# Patient Record
Sex: FEMALE | Race: BLACK OR AFRICAN AMERICAN | NOT HISPANIC OR LATINO | Employment: FULL TIME | URBAN - METROPOLITAN AREA
[De-identification: names, ages, dates, MRNs, and addresses within clinical notes are randomized per-mention and may not be internally consistent; named-entity substitution may affect disease eponyms.]

---

## 2018-09-17 LAB
HPV 16: NEGATIVE
HPV 18: NEGATIVE

## 2019-05-02 PROBLEM — K21.9 GERD (GASTROESOPHAGEAL REFLUX DISEASE): Status: ACTIVE | Noted: 2019-05-02

## 2019-05-02 PROBLEM — J45.40 MODERATE PERSISTENT ASTHMA WITHOUT COMPLICATION: Chronic | Status: ACTIVE | Noted: 2019-05-02

## 2019-05-02 PROBLEM — J32.9 CHRONIC SINUSITIS: Status: ACTIVE | Noted: 2019-05-02

## 2019-05-02 PROBLEM — J45.40 MODERATE PERSISTENT ASTHMA WITHOUT COMPLICATION: Status: ACTIVE | Noted: 2019-05-02

## 2019-08-27 PROBLEM — I82.411 ACUTE DEEP VEIN THROMBOSIS (DVT) OF FEMORAL VEIN OF RIGHT LOWER EXTREMITY: Status: ACTIVE | Noted: 2018-09-19

## 2019-08-27 PROBLEM — K29.01 ACUTE SUPERFICIAL GASTRITIS WITH HEMORRHAGE: Status: ACTIVE | Noted: 2018-01-03

## 2019-08-27 PROBLEM — J30.9 CHRONIC ALLERGIC RHINITIS: Status: ACTIVE | Noted: 2019-08-27

## 2019-08-27 PROBLEM — J45.50 UNCOMPLICATED SEVERE PERSISTENT ASTHMA: Status: ACTIVE | Noted: 2017-05-05

## 2019-08-27 PROBLEM — F33.41 RECURRENT MAJOR DEPRESSIVE DISORDER, IN PARTIAL REMISSION: Status: ACTIVE | Noted: 2019-08-27

## 2019-08-27 PROBLEM — F41.9 ANXIETY: Status: ACTIVE | Noted: 2019-08-27

## 2020-01-28 PROBLEM — J45.50 SEVERE PERSISTENT ASTHMA WITHOUT COMPLICATION: Status: ACTIVE | Noted: 2019-05-02

## 2020-02-12 ENCOUNTER — TELEPHONE (OUTPATIENT)
Dept: PHARMACY | Facility: CLINIC | Age: 55
End: 2020-02-12

## 2020-02-12 NOTE — TELEPHONE ENCOUNTER
Informed Patient  that [facility] received prescription for Fasenra and prior authorization is required.  [facility] will be back in touch once insurance determination is received.

## 2020-04-15 ENCOUNTER — TELEPHONE (OUTPATIENT)
Dept: PHARMACY | Facility: CLINIC | Age: 55
End: 2020-04-15

## 2020-04-15 NOTE — TELEPHONE ENCOUNTER
As of 04/15, patient's insurance has termed. Attempted to contact patient about insurance. Patient does not have voice mail set up and is not active on the [facility] portal. We will continue to try and reach out to the patient.

## 2020-11-10 DIAGNOSIS — U07.1 COVID-19 VIRUS DETECTED: ICD-10-CM

## 2020-11-11 ENCOUNTER — NURSE TRIAGE (OUTPATIENT)
Dept: ADMINISTRATIVE | Facility: CLINIC | Age: 55
End: 2020-11-11

## 2020-11-11 NOTE — TELEPHONE ENCOUNTER
Reason for Disposition   Fever present > 3 days (72 hours)    Additional Information   Negative: Severe difficulty breathing (e.g., struggling for each breath, speaks in single words)   Negative: Difficult to awaken or acting confused (e.g., disoriented, slurred speech)   Negative: Bluish (or gray) lips or face now   Negative: Shock suspected (e.g., cold/pale/clammy skin, too weak to stand, low BP, rapid pulse)   Negative: Sounds like a life-threatening emergency to the triager   Negative: [1] COVID-19 suspected (e.g., cough, fever, shortness of breath) AND [2] mild symptoms AND [3] public health department recommends testing   Negative: [1] COVID-19 exposure AND [2] no symptoms   Negative: COVID-19 and Breastfeeding, questions about   Negative: SEVERE or constant chest pain (Exception: mild central chest pain, present only when coughing)   Negative: MODERATE difficulty breathing (e.g., speaks in phrases, SOB even at rest, pulse 100-120)   Negative: Patient sounds very sick or weak to the triager   Negative: MILD difficulty breathing (e.g., minimal/no SOB at rest, SOB with walking, pulse <100)   Negative: Chest pain   Negative: Fever > 103 F (39.4 C)   Negative: [1] Fever > 101 F (38.3 C) AND [2] age > 60   Negative: [1] Fever > 100.0 F (37.8 C) AND [2] bedridden (e.g., nursing home patient, CVA, chronic illness, recovering from surgery)   Negative: HIGH RISK patient (e.g., age > 64 years, diabetes, heart or lung disease, weak immune system)    Protocols used: CORONAVIRUS (COVID-19) - DIAGNOSED OR [id number]

## 2020-11-11 NOTE — TELEPHONE ENCOUNTER
"Patient escalated to Covid 19 symptom tracker in response to text message. Patient's identity verified by stating first and last names and .  Patient stated she responded "yes" because she is still running fever.  Patient stated she checked her temperature at 2 pm and it was 101 and she took 2 tylenol.  luis fernando stated she was seen in ED yesterdau and given a prescription for zithromax and prednisone and she has a productive cough with clear sputum.  Patient rates her SOB at rest a "2" and SOB on exertion at times a "4" but she has a nebulizer that helps with the SOB.  Patient denies CP  Patient stated she does not have a pulse oximeter to measure her O2 sat and HR.  Informed patient she can  one at a nearest pharmacy or order one on [employer].  Patient agreed and will follow disposition and will notify her PCP if her fever and SOB persists  Patient has [facility] number for non-emergent needs and has access to 911 for emergencies  "

## 2020-12-31 PROBLEM — R06.09 DOE (DYSPNEA ON EXERTION): Status: ACTIVE | Noted: 2020-12-31

## 2021-01-22 PROBLEM — E53.8 VITAMIN B 12 DEFICIENCY: Status: ACTIVE | Noted: 2021-01-22

## 2021-01-22 PROBLEM — M26.622 ARTHRALGIA OF LEFT TEMPOROMANDIBULAR JOINT: Status: ACTIVE | Noted: 2021-01-22

## 2021-01-22 PROBLEM — Z86.718 HISTORY OF DVT (DEEP VEIN THROMBOSIS): Status: ACTIVE | Noted: 2021-01-22

## 2021-01-22 PROBLEM — E55.9 VITAMIN D DEFICIENCY: Status: ACTIVE | Noted: 2021-01-22

## 2021-01-22 PROBLEM — J30.2 SEASONAL ALLERGIES: Status: ACTIVE | Noted: 2021-01-22

## 2021-01-22 PROBLEM — E78.5 HYPERLIPIDEMIA: Status: ACTIVE | Noted: 2021-01-22

## 2021-04-09 ENCOUNTER — PATIENT OUTREACH (OUTPATIENT)
Dept: ADMINISTRATIVE | Facility: HOSPITAL | Age: 56
End: 2021-04-09

## 2021-07-13 ENCOUNTER — PATIENT OUTREACH (OUTPATIENT)
Dept: ADMINISTRATIVE | Facility: HOSPITAL | Age: 56
End: 2021-07-13

## 2021-08-24 PROBLEM — K29.01 ACUTE SUPERFICIAL GASTRITIS WITH HEMORRHAGE: Status: RESOLVED | Noted: 2018-01-03 | Resolved: 2021-08-24

## 2021-08-24 PROBLEM — I82.411 ACUTE DEEP VEIN THROMBOSIS (DVT) OF FEMORAL VEIN OF RIGHT LOWER EXTREMITY: Status: RESOLVED | Noted: 2018-09-19 | Resolved: 2021-08-24

## 2021-08-24 PROBLEM — J45.41 MODERATE PERSISTENT ASTHMA WITH EXACERBATION: Status: ACTIVE | Noted: 2021-08-24

## 2021-09-01 PROBLEM — J45.41 MODERATE PERSISTENT ASTHMA WITH EXACERBATION: Status: RESOLVED | Noted: 2021-08-24 | Resolved: 2021-09-01

## 2021-09-01 PROBLEM — J45.50 SEVERE PERSISTENT ASTHMA WITHOUT COMPLICATION: Status: RESOLVED | Noted: 2019-05-02 | Resolved: 2021-09-01

## 2021-12-28 ENCOUNTER — PATIENT OUTREACH (OUTPATIENT)
Dept: ADMINISTRATIVE | Facility: HOSPITAL | Age: 56
End: 2021-12-28

## 2022-01-11 PROBLEM — J30.2 SEASONAL ALLERGIES: Status: RESOLVED | Noted: 2021-01-22 | Resolved: 2022-01-11

## 2022-01-11 PROBLEM — D68.59 ANTITHROMBIN III DEFICIENCY: Status: ACTIVE | Noted: 2022-01-11

## 2022-01-11 PROBLEM — E66.01 SEVERE OBESITY (BMI 35.0-39.9) WITH COMORBIDITY: Status: ACTIVE | Noted: 2022-01-11

## 2022-01-11 PROBLEM — M26.622 ARTHRALGIA OF LEFT TEMPOROMANDIBULAR JOINT: Status: RESOLVED | Noted: 2021-01-22 | Resolved: 2022-01-11

## 2022-01-11 PROBLEM — I27.20 PULMONARY HYPERTENSION: Status: ACTIVE | Noted: 2022-01-11

## 2022-05-04 PROBLEM — H60.313 ACUTE DIFFUSE OTITIS EXTERNA OF BOTH EARS: Status: ACTIVE | Noted: 2022-05-04

## 2022-05-04 PROBLEM — K04.7 TOOTH ABSCESS: Status: ACTIVE | Noted: 2022-05-04

## 2022-05-04 PROBLEM — J01.01 ACUTE RECURRENT MAXILLARY SINUSITIS: Status: ACTIVE | Noted: 2022-05-04

## 2022-08-26 PROBLEM — R06.09 DOE (DYSPNEA ON EXERTION): Status: RESOLVED | Noted: 2020-12-31 | Resolved: 2022-08-26

## 2022-08-26 PROBLEM — H60.313 ACUTE DIFFUSE OTITIS EXTERNA OF BOTH EARS: Status: RESOLVED | Noted: 2022-05-04 | Resolved: 2022-08-26

## 2022-08-26 PROBLEM — K04.7 TOOTH ABSCESS: Status: RESOLVED | Noted: 2022-05-04 | Resolved: 2022-08-26

## 2022-08-26 PROBLEM — J01.01 ACUTE RECURRENT MAXILLARY SINUSITIS: Status: RESOLVED | Noted: 2022-05-04 | Resolved: 2022-08-26

## 2023-01-03 PROBLEM — K52.9 INFLAMMATION OF STOMACH AND INTESTINE: Status: RESOLVED | Noted: 2023-01-03 | Resolved: 2023-01-03

## 2023-01-03 PROBLEM — K04.7 TOOTH ABSCESS: Status: ACTIVE | Noted: 2023-01-03

## 2023-01-03 PROBLEM — K21.00 GASTROESOPHAGEAL REFLUX DISEASE WITH ESOPHAGITIS WITHOUT HEMORRHAGE: Status: ACTIVE | Noted: 2019-05-02

## 2023-01-03 PROBLEM — K52.9 INFLAMMATION OF STOMACH AND INTESTINE: Status: ACTIVE | Noted: 2023-01-03
